# Patient Record
Sex: FEMALE | Race: BLACK OR AFRICAN AMERICAN | NOT HISPANIC OR LATINO | Employment: FULL TIME | URBAN - METROPOLITAN AREA
[De-identification: names, ages, dates, MRNs, and addresses within clinical notes are randomized per-mention and may not be internally consistent; named-entity substitution may affect disease eponyms.]

---

## 2024-09-17 ENCOUNTER — OFFICE VISIT (OUTPATIENT)
Dept: PRIMARY CARE CLINIC | Facility: CLINIC | Age: 49
End: 2024-09-17
Payer: COMMERCIAL

## 2024-09-17 VITALS
TEMPERATURE: 99 F | OXYGEN SATURATION: 99 % | WEIGHT: 170 LBS | BODY MASS INDEX: 28.32 KG/M2 | HEIGHT: 65 IN | HEART RATE: 93 BPM | DIASTOLIC BLOOD PRESSURE: 70 MMHG | SYSTOLIC BLOOD PRESSURE: 124 MMHG

## 2024-09-17 DIAGNOSIS — Z12.11 COLON CANCER SCREENING: ICD-10-CM

## 2024-09-17 DIAGNOSIS — Z80.3 FAMILY HISTORY OF BREAST CANCER IN FIRST DEGREE RELATIVE: ICD-10-CM

## 2024-09-17 DIAGNOSIS — Z11.4 SCREENING FOR HIV (HUMAN IMMUNODEFICIENCY VIRUS): ICD-10-CM

## 2024-09-17 DIAGNOSIS — Z13.6 ENCOUNTER FOR LIPID SCREENING FOR CARDIOVASCULAR DISEASE: ICD-10-CM

## 2024-09-17 DIAGNOSIS — Z13.220 ENCOUNTER FOR LIPID SCREENING FOR CARDIOVASCULAR DISEASE: ICD-10-CM

## 2024-09-17 DIAGNOSIS — Z00.00 GENERAL MEDICAL EXAM: Primary | ICD-10-CM

## 2024-09-17 DIAGNOSIS — Z12.4 SCREENING FOR CERVICAL CANCER: ICD-10-CM

## 2024-09-17 DIAGNOSIS — Z13.1 SCREENING FOR DIABETES MELLITUS: ICD-10-CM

## 2024-09-17 DIAGNOSIS — Z11.59 ENCOUNTER FOR HEPATITIS C SCREENING TEST FOR LOW RISK PATIENT: ICD-10-CM

## 2024-09-17 PROCEDURE — 99999 PR PBB SHADOW E&M-NEW PATIENT-LVL IV: CPT | Mod: PBBFAC,,, | Performed by: NURSE PRACTITIONER

## 2024-09-17 PROCEDURE — 99204 OFFICE O/P NEW MOD 45 MIN: CPT | Mod: S$GLB,,, | Performed by: NURSE PRACTITIONER

## 2024-09-18 ENCOUNTER — TELEPHONE (OUTPATIENT)
Dept: OBSTETRICS AND GYNECOLOGY | Facility: CLINIC | Age: 49
End: 2024-09-18
Payer: COMMERCIAL

## 2024-09-18 NOTE — TELEPHONE ENCOUNTER
Lvm for pt to return call to get appointment scheduled with our department.   Received referral for [facility].

## 2024-09-20 ENCOUNTER — TELEPHONE (OUTPATIENT)
Dept: OBSTETRICS AND GYNECOLOGY | Facility: CLINIC | Age: 49
End: 2024-09-20
Payer: COMMERCIAL

## 2024-09-23 NOTE — PROGRESS NOTES
"Subjective:       Patient ID: Karley Sofia is a 49 y.o. female.    Chief Complaint: Establish Care      History of Present Illness:   Karley Sofia 49 y.o. female presents today presents today to address care gaps and establish care. Denies any other problems or concerns at this time. Treatment options and alternatives were discussed with the patient. Patient provided opportunity to ask additional questions.  All questions were answered. Voices understanding and acceptance of this advice. Instructed to call back if any further questions or concerns.   History reviewed. No pertinent past medical history.  Family History   Problem Relation Name Age of Onset    Hypertension Mother      Breast cancer Mother       Social History     Socioeconomic History    Marital status: Single   Tobacco Use    Smoking status: Never    Smokeless tobacco: Never   Substance and Sexual Activity    Alcohol use: Yes    Drug use: Never    Sexual activity: Yes     No outpatient encounter medications on file as of 9/17/2024.     No facility-administered encounter medications on file as of 9/17/2024.       Review of Systems    Objective:      /70 (BP Location: Right arm, Patient Position: Sitting)   Pulse 93   Temp 99 °F (37.2 °C) (Oral)   Ht 5' 5" (1.651 m)   Wt 77.1 kg (170 lb)   LMP  (Within Months)   SpO2 99%   BMI 28.29 kg/m²   Physical Exam    Results for orders placed or performed in visit on 05/17/12   CBC auto differential   Result Value Ref Range    WBC 5.12 4.8 - 10.8 K/uL    RBC 4.60 4.00 - 5.40 M/uL    Hemoglobin 8.8 (L) 12.0 - 16.0 gm/dl    Hematocrit 30.6 (L) 37.0 - 48.5 %    MCV 66.5 (L) 82 - 95 fL    MCH 19.1 (L) 27 - 31 pg    MCHC 28.8 (L) 32 - 36 %    RDW 19.5 (H) 11.5 - 14.5 %    Platelets 339 150 - 350 K/uL    MPV 8.9 (L) 9.2 - 12.9 fL   Urinalysis, Routine   Result Value Ref Range    Specimen UA Voided     Color, UA Yellow Yellow    Appearance, UA Clear Clear    pH, UA 6.0 4.5 - 8.0    Specific Gravity, UA " 1.020 1.005 - 1.030    Protein, UA Negative Negative mg/dl    Glucose, UA Negative Negative mg/dl    Red Sub, UA 2+ (A) Negative    Ketones, UA Negative Negative mg/dl    Bilirubin (UA) Negative Negative    Occult Blood UA Negative Negative    Nitrite, UA Negative Negative    Leukocytes, UA Negative Negative    RBC, UA 1-2 0 - 4 /hpf    WBC, UA Rare 0 - 5 /hpf    Bacteria Few None-Occ    Squam Epithel, UA 10 /hpf   Comprehensive metabolic panel   Result Value Ref Range    Glucose 100 70 - 110 mg/dl    BUN 12 6 - 20 mg/dl    Creatinine 0.6 0.5 - 1.4 mg/dl    Calcium 9.0 8.7 - 10.5 mg/dl    Sodium 140 136 - 145 mmol/L    Potassium 3.8 3.5 - 5.1 mmol/L    Chloride 103 95 - 110 mmol/L    Total Protein 7.9 6.0 - 8.4 g/dL    Albumin 4.2 3.5 - 5.2 g/dl    Total Bilirubin 0.2 0.1 - 1.0 mg/dl    AST 15 10 - 40 U/L    Alkaline Phosphatase 65 55 - 135 U/L    CO2 25 23.0 - 29.0 mmol/L    ALT 20 10 - 44 U/L    Anion Gap 12 8 - 16 mmol/L    eGFR if non African American >60 >60 mL/min    eGFR if African American >60 >60 mL/min   Manual Differential   Result Value Ref Range    Segs 52 50 - 70 %    Lymphs 36 25 - 40 %    Monocytes 9 0 - 10 %    Eosinophils 3 0 - 8 %    Aniso 1+ (A)     Poik sl     Hypo sl     Platelet Estimate Adequate Normal   Lipid panel   Result Value Ref Range    Cholesterol 193 120 - 199 mg/dL    Triglycerides 163 (H) 30 - 150 mg/dL    HDL 48 40 - 75 mg/dL    LDL Cholesterol 112.0 63 - 159 mg/dL    HDL/Cholesterol Ratio 24.9 20 - 50 %    Total Cholesterol/HDL Ratio 4.0 2 - 5     Assessment:       1. General medical exam    2. Screening for HIV (human immunodeficiency virus)    3. Encounter for hepatitis C screening test for low risk patient    4. Screening for diabetes mellitus    5. Encounter for lipid screening for cardiovascular disease    6. Screening for cervical cancer    7. Colon cancer screening    8. Family history of breast cancer in first degree relative        Plan:   General medical exam  -     CBC  Auto Differential; Future; Expected date: 09/17/2024  -     Comprehensive Metabolic Panel; Future; Expected date: 09/17/2024    Screening for HIV (human immunodeficiency virus)  -     HIV 1/2 Ag/Ab (4th Gen); Future; Expected date: 09/17/2024    Encounter for hepatitis C screening test for low risk patient  -     Hepatitis C Antibody; Future; Expected date: 09/17/2024    Screening for diabetes mellitus  -     Hemoglobin A1C; Future; Expected date: 09/17/2024  -     TSH; Future; Expected date: 09/17/2024  -     T3, Free; Future; Expected date: 09/17/2024  -     T4, Free; Future; Expected date: 09/17/2024    Encounter for lipid screening for cardiovascular disease  -     Lipid Panel; Future; Expected date: 09/17/2024    Screening for cervical cancer  -     Ambulatory referral/consult to Gynecology; Future; Expected date: 09/24/2024    Colon cancer screening  -     Fecal Immunochemical Test (iFOBT); Future; Expected date: 09/17/2024    Family history of breast cancer in first degree relative  -     Mammo Digital Screening Bilat; Future; Expected date: 09/17/2024             [facility]   [address]  [place] [address]  Office [phone]  Fax [phone]